# Patient Record
Sex: FEMALE | Race: WHITE | NOT HISPANIC OR LATINO | Employment: UNEMPLOYED | URBAN - METROPOLITAN AREA
[De-identification: names, ages, dates, MRNs, and addresses within clinical notes are randomized per-mention and may not be internally consistent; named-entity substitution may affect disease eponyms.]

---
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
## 2022-07-07 ENCOUNTER — OFFICE VISIT (OUTPATIENT)
Dept: URGENT CARE | Facility: PHYSICIAN GROUP | Age: 22
End: 2022-07-07
Payer: MEDICAID

## 2022-07-07 VITALS
RESPIRATION RATE: 14 BRPM | SYSTOLIC BLOOD PRESSURE: 110 MMHG | OXYGEN SATURATION: 98 % | HEART RATE: 104 BPM | DIASTOLIC BLOOD PRESSURE: 74 MMHG | TEMPERATURE: 98 F | WEIGHT: 104 LBS | HEIGHT: 61 IN | BODY MASS INDEX: 19.63 KG/M2

## 2022-07-07 DIAGNOSIS — J02.0 STREP PHARYNGITIS: ICD-10-CM

## 2022-07-07 LAB
INT CON NEG: NORMAL
INT CON POS: NORMAL
S PYO AG THROAT QL: NORMAL

## 2022-07-07 PROCEDURE — 87880 STREP A ASSAY W/OPTIC: CPT | Performed by: PHYSICIAN ASSISTANT

## 2022-07-07 PROCEDURE — 99203 OFFICE O/P NEW LOW 30 MIN: CPT | Performed by: PHYSICIAN ASSISTANT

## 2022-07-07 RX ORDER — AMOXICILLIN 875 MG/1
875 TABLET, COATED ORAL 2 TIMES DAILY
Qty: 20 TABLET | Refills: 0 | Status: SHIPPED | OUTPATIENT
Start: 2022-07-07 | End: 2022-07-17

## 2022-07-07 ASSESSMENT — ENCOUNTER SYMPTOMS
FEVER: 0
NAUSEA: 0
DIAPHORESIS: 0
MYALGIAS: 0
SORE THROAT: 1
SHORTNESS OF BREATH: 0
SPUTUM PRODUCTION: 0
PALPITATIONS: 0
COUGH: 0
WHEEZING: 0
DIARRHEA: 0
VOMITING: 0
CHILLS: 0
SINUS PAIN: 0
HEADACHES: 0
ABDOMINAL PAIN: 0
DIZZINESS: 0

## 2022-08-16 ENCOUNTER — OFFICE VISIT (OUTPATIENT)
Dept: URGENT CARE | Facility: PHYSICIAN GROUP | Age: 22
End: 2022-08-16
Payer: MEDICAID

## 2022-08-16 ENCOUNTER — HOSPITAL ENCOUNTER (OUTPATIENT)
Facility: MEDICAL CENTER | Age: 22
End: 2022-08-16
Payer: MEDICAID

## 2022-08-16 VITALS
WEIGHT: 107 LBS | SYSTOLIC BLOOD PRESSURE: 110 MMHG | DIASTOLIC BLOOD PRESSURE: 84 MMHG | OXYGEN SATURATION: 97 % | HEART RATE: 57 BPM | HEIGHT: 61 IN | TEMPERATURE: 98 F | BODY MASS INDEX: 20.2 KG/M2 | RESPIRATION RATE: 16 BRPM

## 2022-08-16 DIAGNOSIS — N89.8 VAGINAL DISCHARGE: ICD-10-CM

## 2022-08-16 LAB
APPEARANCE UR: CLEAR
BILIRUB UR STRIP-MCNC: NEGATIVE MG/DL
CANDIDA DNA VAG QL PROBE+SIG AMP: NEGATIVE
COLOR UR AUTO: YELLOW
G VAGINALIS DNA VAG QL PROBE+SIG AMP: NEGATIVE
GLUCOSE UR STRIP.AUTO-MCNC: NEGATIVE MG/DL
INT CON NEG: NORMAL
INT CON POS: NORMAL
KETONES UR STRIP.AUTO-MCNC: NEGATIVE MG/DL
LEUKOCYTE ESTERASE UR QL STRIP.AUTO: NEGATIVE
NITRITE UR QL STRIP.AUTO: NEGATIVE
PH UR STRIP.AUTO: 7 [PH] (ref 5–8)
POC URINE PREGNANCY TEST: NEGATIVE
PROT UR QL STRIP: NEGATIVE MG/DL
RBC UR QL AUTO: NEGATIVE
SP GR UR STRIP.AUTO: 1.01
T VAGINALIS DNA VAG QL PROBE+SIG AMP: NEGATIVE
UROBILINOGEN UR STRIP-MCNC: NEGATIVE MG/DL

## 2022-08-16 PROCEDURE — 81025 URINE PREGNANCY TEST: CPT

## 2022-08-16 PROCEDURE — 87510 GARDNER VAG DNA DIR PROBE: CPT

## 2022-08-16 PROCEDURE — 81002 URINALYSIS NONAUTO W/O SCOPE: CPT

## 2022-08-16 PROCEDURE — 87491 CHLMYD TRACH DNA AMP PROBE: CPT

## 2022-08-16 PROCEDURE — 99214 OFFICE O/P EST MOD 30 MIN: CPT

## 2022-08-16 PROCEDURE — 87591 N.GONORRHOEAE DNA AMP PROB: CPT

## 2022-08-16 PROCEDURE — 87480 CANDIDA DNA DIR PROBE: CPT

## 2022-08-16 PROCEDURE — 87660 TRICHOMONAS VAGIN DIR PROBE: CPT

## 2022-08-16 NOTE — PROGRESS NOTES
Subjective:   Jessica Lawrence is a 22 y.o. female who presents for Vaginal Discharge (White and thick, x1day last day of intercourse was thursday) and Abdominal Pain      HPI:  Vaginal discharge- This is a  new problem. Patient reports experiencing white, thick vaginal discharge x1 day. Vaginal discharge has resolved at this time. She denies foul smelling discharge. Denies vaginal itching. She does report mild pelvic pressure, denies pain. She has not taken anything for her symptoms.  Denies associated urinary symptoms, abdominal pain, n/v/d,fevers, chills, body aches. She is requesting STD screening today. She denies high risk sexual behaviors, or known STI exposure.       Review of Systems   Constitutional:  Negative for chills, diaphoresis, fever, malaise/fatigue and weight loss.   Gastrointestinal:  Negative for abdominal pain, nausea and vomiting.   Genitourinary:  Negative for dysuria, flank pain, frequency, hematuria and urgency.        +pelvic pressure   Musculoskeletal:  Negative for myalgias.   All other systems reviewed and are negative.    Medications:    Current Outpatient Medications on File Prior to Visit   Medication Sig Dispense Refill    cyclobenzaprine (FLEXERIL) 10 MG Tab Take 1 Tab by mouth 3 times a day as needed for Mild Pain or Muscle Spasms. (Patient not taking: No sig reported) 30 Tab 0    naproxen sodium (ANAPROX) 275 MG tablet Take 1 Tab by mouth 2 times a day as needed (pain). (Patient not taking: No sig reported) 20 Tab 0     No current facility-administered medications on file prior to visit.        Allergies:   Patient has no known allergies.    Problem List:   There is no problem list on file for this patient.       Surgical History:  No past surgical history on file.    Past Social Hx:   Social History     Tobacco Use    Smoking status: Never    Smokeless tobacco: Never   Vaping Use    Vaping Use: Never used   Substance Use Topics    Alcohol use: No    Drug use: No          Problem  "list, medications, and allergies reviewed by myself today in Epic.     Objective:     /84   Pulse (!) 57   Temp 36.7 °C (98 °F) (Temporal)   Resp 16   Ht 1.549 m (5' 1\")   Wt 48.5 kg (107 lb)   LMP 07/29/2022 (Approximate)   SpO2 97%   BMI 20.22 kg/m²     Physical Exam  Vitals and nursing note reviewed.   Constitutional:       General: She is awake. She is not in acute distress.     Appearance: Normal appearance. She is normal weight. She is not ill-appearing or toxic-appearing.   HENT:      Head: Normocephalic and atraumatic.   Cardiovascular:      Rate and Rhythm: Normal rate and regular rhythm.      Pulses: Normal pulses.      Heart sounds: Normal heart sounds. No murmur heard.    No friction rub. No gallop.   Pulmonary:      Effort: Pulmonary effort is normal. No respiratory distress.      Breath sounds: Normal breath sounds. No stridor. No wheezing, rhonchi or rales.   Chest:      Chest wall: No tenderness.   Abdominal:      General: Abdomen is flat. Bowel sounds are normal. There is no distension.      Palpations: Abdomen is soft. There is no mass.      Tenderness: There is no abdominal tenderness. There is no right CVA tenderness, left CVA tenderness, guarding or rebound.      Hernia: No hernia is present.   Genitourinary:     Comments: Deferred   Musculoskeletal:      Cervical back: Neck supple. No tenderness.   Lymphadenopathy:      Cervical: No cervical adenopathy.   Skin:     General: Skin is warm and dry.      Capillary Refill: Capillary refill takes less than 2 seconds.   Neurological:      General: No focal deficit present.      Mental Status: She is alert and oriented to person, place, and time. Mental status is at baseline.      Cranial Nerves: No cranial nerve deficit.      Motor: No weakness.      Gait: Gait normal.   Psychiatric:         Mood and Affect: Mood normal.         Behavior: Behavior normal. Behavior is cooperative.         Thought Content: Thought content normal.         " Judgment: Judgment normal.       Assessment/Plan:     Diagnosis and associated orders:   1. Vaginal discharge  POCT Urinalysis    POCT PREGNANCY    VAGINAL PATHOGENS DNA PANEL    Chlamydia/GC, PCR (Genital/Anal swab)    CANCELED: Chlamydia/GC, PCR (Genital/Anal swab)             Comments/MDM:   Pt is clinically stable at today's acute urgent care visit.  No acute distress noted. Appropriate for outpatient management at this time.     Acute problem. UA unremarkable today, POC HCG negative. Will send vaginal swabs for vaginal pathogen analysis and GC/Chlamydia; will follow and notify patient of results.  At this time, patient's vaginal discharge has resolved. Patient was instructed to monitor symptoms and return for any new or worsening s/s; follow up with GYN for recheck. Patient verbalized understanding.            Discussed DDx, management options (risks,benefits, and alternatives to planned treatment), natural progression and supportive care.  Expressed understanding and the treatment plan was agreed upon. Questions were encouraged and answered   Return to urgent care prn if new or worsening sx or if there is no improvement in condition prn.    Educated in Red flags and indications to immediately call 911 or present to the Emergency Department.   Advised the patient to follow-up with the primary care physician for recheck, reevaluation, and consideration of further management.    I personally reviewed prior external notes and test results pertinent to today's visit.  I have independently reviewed and interpreted all diagnostics ordered during this urgent care acute visit.        Please note that this dictation was created using voice recognition software. I have made a reasonable attempt to correct obvious errors, but I expect that there are errors of grammar and possibly content that I did not discover before finalizing the note.    This note was electronically signed by WAGNER Cruz

## 2022-08-17 LAB
C TRACH DNA GENITAL QL NAA+PROBE: NEGATIVE
N GONORRHOEA DNA GENITAL QL NAA+PROBE: NEGATIVE
SPECIMEN SOURCE: NORMAL

## 2022-08-17 ASSESSMENT — ENCOUNTER SYMPTOMS
FLANK PAIN: 0
FEVER: 0
DIAPHORESIS: 0
WEIGHT LOSS: 0
CHILLS: 0
MYALGIAS: 0
VOMITING: 0
NAUSEA: 0
ABDOMINAL PAIN: 0

## 2022-10-20 ENCOUNTER — HOSPITAL ENCOUNTER (EMERGENCY)
Facility: MEDICAL CENTER | Age: 22
End: 2022-10-20
Attending: EMERGENCY MEDICINE
Payer: MEDICAID

## 2022-10-20 VITALS
SYSTOLIC BLOOD PRESSURE: 117 MMHG | OXYGEN SATURATION: 97 % | WEIGHT: 111.55 LBS | TEMPERATURE: 98.5 F | BODY MASS INDEX: 21.06 KG/M2 | HEIGHT: 61 IN | RESPIRATION RATE: 16 BRPM | HEART RATE: 85 BPM | DIASTOLIC BLOOD PRESSURE: 79 MMHG

## 2022-10-20 DIAGNOSIS — J35.8 TONSILLAR BLEED: ICD-10-CM

## 2022-10-20 DIAGNOSIS — J02.9 PHARYNGITIS, UNSPECIFIED ETIOLOGY: ICD-10-CM

## 2022-10-20 LAB — S PYO DNA SPEC NAA+PROBE: NOT DETECTED

## 2022-10-20 PROCEDURE — 87651 STREP A DNA AMP PROBE: CPT

## 2022-10-20 PROCEDURE — 99283 EMERGENCY DEPT VISIT LOW MDM: CPT

## 2022-10-20 NOTE — DISCHARGE INSTRUCTIONS
Use a hydroperoxide water mixture and swish and spit rinse twice a day.  Return to [facility] if you profound pain, difficulty swallowing, bleeding.

## 2022-10-20 NOTE — ED PROVIDER NOTES
"ED Provider Note    CHIEF COMPLAINT  Chief Complaint   Patient presents with    Sent from Urgent Care    Sore Throat       HPI  Jessica Lawrence is a 22 y.o. female who presents to the emergency department with complaint of sore throat and bleeding from her throat.  She states that she woke up this morning and had a sore throat and noticed that her back her tonsils are slight blood.  Denies eating fish, any bony meat, any foreign body.  She does state that she had lasagna and groggy last night.  She does complains of slight neck discomfort.  She went to urgent care, they told her to come straight here without evaluating her.  The patient denies fever, shakes, chills, sweats, nausea, vomiting, profound neck pain, cough or recent trauma.    REVIEW OF SYSTEMS  Positives as above. Pertinent negatives include fever, cough, neck stiffness, neck pain      PAST MEDICAL HISTORY  History reviewed. No pertinent past medical history.    FAMILY HISTORY  Noncontributory    SOCIAL HISTORY  Social History     Socioeconomic History    Marital status: Single   Tobacco Use    Smoking status: Never    Smokeless tobacco: Never   Vaping Use    Vaping Use: Never used   Substance and Sexual Activity    Alcohol use: No    Drug use: No       SURGICAL HISTORY  History reviewed. No pertinent surgical history.    CURRENT MEDICATIONS  Home Medications    **Home medications have not yet been reviewed for this encounter**         ALLERGIES  No Known Allergies    PHYSICAL EXAM  VITAL SIGNS: /79   Pulse 85   Temp 36.9 °C (98.5 °F) (Temporal)   Resp 16   Ht 1.549 m (5' 1\")   Wt 50.6 kg (111 lb 8.8 oz)   LMP 09/20/2022 (Approximate)   SpO2 97%   BMI 21.08 kg/m²      Constitutional: Well developed, Well nourished, No acute distress, Non-toxic appearance.   Eyes: PERRLA, EOMI, Conjunctiva normal, No discharge.   HENT: Tonsillar edema, there is a clot in the left lingual fold the left tonsil, no foreign body, no active bleeding, no anterior " cervical lymphadenopathy, no abscess, no mass  Neck: No meningismus  Cardiovascular: Normal heart rate, Normal rhythm, No murmurs, No rubs, No gallops, and intact distal pulses.   Skin: Warm, Dry, No erythema, No rash.   Neurologic: Alert & oriented x 3, No focal deficits noted, acting appropriately on exam.  Psychiatric: Affect normal for clinical presentation.        LABORATORY/ECG  Results for orders placed or performed during the hospital encounter of 10/20/22   Group A Strep by PCR    Specimen: Throat   Result Value Ref Range    Group A Strep by PCR Not Detected Not Detected       COURSE & MEDICAL DECISION MAKING  Pertinent Labs & Imaging studies reviewed. (See chart for details)  This is a pleasant 22-year-old female presents with pharyngitis, slight hemorrhage in the posterior tonsil.  The patient has no evidence of strep throat, no [place] peritonsillar abscess.  Physical examination reveals evidence of a slight abrasion that has a clot formed.  Please refer to the picture.  She has no evidence of profound bleeding, no evidence of respiratory stress or respiratory failure.  I have asked the patient to follow-up [facility] she has increasing symptomatology as we do not have ENT on-call this facility, utilize ice therapy, hydroperoxide and water swish and spit.  The patient will be following up as needed.      FINAL IMPRESSION     1. Pharyngitis, unspecified etiology    2. Tonsillar bleed        DISPOSITION:  Patient will be discharged home in stable condition.    FOLLOW UP:  [address] [phone]    If symptoms worsen        Electronically signed by: Diego Hernandez D.O., 10/20/2022 9:38 AM

## 2022-10-20 NOTE — ED TRIAGE NOTES
"Jessica Lawrence  22 y.o.  Chief Complaint   Patient presents with    Sent from Urgent Care    Sore Throat     Pt ambulatory to triage.  Pt reports she has had a sore throat x 2 days.  Pt reports she woke up this morning and when she looked in her mouth with a flashlight she could see her \"tonsil bleeding\".  Pt went to urgent care and when she was checking in, she was told to come to the ER.  Pt in no distress in triage.  Pt rates her pain 2/10.  "

## 2022-10-20 NOTE — ED NOTES
Pt provided with dc paper work and follow up care instructions. Pt declines questions and was able to ambulate out of ER.

## 2022-10-30 ENCOUNTER — OFFICE VISIT (OUTPATIENT)
Dept: URGENT CARE | Facility: PHYSICIAN GROUP | Age: 22
End: 2022-10-30
Payer: MEDICAID

## 2022-10-30 VITALS
WEIGHT: 109 LBS | OXYGEN SATURATION: 99 % | SYSTOLIC BLOOD PRESSURE: 98 MMHG | HEART RATE: 99 BPM | TEMPERATURE: 97.9 F | RESPIRATION RATE: 16 BRPM | DIASTOLIC BLOOD PRESSURE: 58 MMHG | HEIGHT: 61 IN | BODY MASS INDEX: 20.58 KG/M2

## 2022-10-30 DIAGNOSIS — K13.0 INFECTION OF LIP: ICD-10-CM

## 2022-10-30 PROCEDURE — 99213 OFFICE O/P EST LOW 20 MIN: CPT | Performed by: FAMILY MEDICINE

## 2022-10-30 RX ORDER — DOXYCYCLINE HYCLATE 100 MG
100 TABLET ORAL 2 TIMES DAILY
Qty: 20 TABLET | Refills: 0 | Status: SHIPPED | OUTPATIENT
Start: 2022-10-30 | End: 2022-11-09

## 2022-10-30 NOTE — PROGRESS NOTES
"Chief Complaint:    Chief Complaint   Patient presents with    Mouth Lesions     Has been using otc, can't tell if it helping, painful, now spreading to gums, pus on lips. X 4 days       History of Present Illness:    Started with a bump on lower lip on 10/24/22, tried Carmex to area, but problem has progressed to involve much more of lower lip - tender, crusty, feels it is spreading to affect lower gum.      Past Medical History:    No past medical history on file.    Past Surgical History:    No past surgical history on file.    Social History:    Social History     Socioeconomic History    Marital status: Single     Spouse name: Not on file    Number of children: Not on file    Years of education: Not on file    Highest education level: Not on file   Occupational History    Not on file   Tobacco Use    Smoking status: Never    Smokeless tobacco: Never   Vaping Use    Vaping Use: Never used   Substance and Sexual Activity    Alcohol use: No    Drug use: No    Sexual activity: Not on file   Other Topics Concern    Not on file   Social History Narrative    Not on file     Social Determinants of Health     Financial Resource Strain: Not on file   Food Insecurity: Not on file   Transportation Needs: Not on file   Physical Activity: Not on file   Stress: Not on file   Social Connections: Not on file   Intimate Partner Violence: Not on file   Housing Stability: Not on file     Family History:    No family history on file.    Medications:    No current outpatient medications on file prior to visit.     No current facility-administered medications on file prior to visit.     Allergies:    No Known Allergies      Vitals:    Vitals:    10/30/22 1444   BP: (!) 98/58   Pulse: 99   Resp: 16   Temp: 36.6 °C (97.9 °F)   TempSrc: Temporal   SpO2: 99%   Weight: 49.4 kg (109 lb)   Height: 1.549 m (5' 1\")       Physical Exam:    Constitutional: Vital signs reviewed. Appears well-developed and well-nourished. No acute distress. "   Eyes: Sclera white, conjunctivae clear.   ENT: Lower lip: diffuse swelling and crusting. External ears normal. Hearing normal. Oral mucosa pink and moist. Posterior pharynx: WNL.  Neck: Neck supple.   Pulmonary/Chest: Respirations non-labored.   Musculoskeletal: Normal gait. No muscular atrophy or weakness.  Neurological: Alert and oriented to person, place, and time. Muscle tone normal. Coordination normal.   Skin: No rashes or lesions. Warm, dry, normal turgor.  Psychiatric: Normal mood and affect. Behavior is normal. Judgment and thought content normal.       Medical Decision Makin. Infection of lip  - doxycycline (VIBRAMYCIN) 100 MG Tab; Take 1 Tablet by mouth 2 times a day for 10 days.  Dispense: 20 Tablet; Refill: 0  - mupirocin (BACTROBAN) 2 % Ointment; APPLY TO AFFECTED AREA OF LIPS 3 TIMES A DAY UNTIL HEALED.  Dispense: 22 g; Refill: 3       Discussed with her DDX, management options, and risks, benefits, and alternatives to treatment plan agreed upon.    Started with a bump on lower lip on 10/24/22, tried Carmex to area, but problem has progressed to involve much more of lower lip - tender, crusty, feels it is spreading to affect lower gum.    Lower lip: diffuse swelling and crusting.    Lip findings look like bacterial infection and she is agreeable to treat as such.    Agreeable to medications prescribed.    Discussed expected course of duration, time for improvement, and to seek follow-up in Emergency Room, urgent care, or with PCP if getting worse at any time or not improving within expected time frame.

## 2023-10-27 ENCOUNTER — HOSPITAL ENCOUNTER (EMERGENCY)
Facility: MEDICAL CENTER | Age: 23
End: 2023-10-27
Attending: EMERGENCY MEDICINE
Payer: MEDICAID

## 2023-10-27 VITALS
SYSTOLIC BLOOD PRESSURE: 138 MMHG | DIASTOLIC BLOOD PRESSURE: 86 MMHG | HEIGHT: 61 IN | WEIGHT: 116.84 LBS | OXYGEN SATURATION: 97 % | RESPIRATION RATE: 18 BRPM | BODY MASS INDEX: 22.06 KG/M2 | TEMPERATURE: 98.5 F | HEART RATE: 80 BPM

## 2023-10-27 DIAGNOSIS — S05.02XA ABRASION OF LEFT CORNEA, INITIAL ENCOUNTER: ICD-10-CM

## 2023-10-27 PROCEDURE — 700101 HCHG RX REV CODE 250: Mod: UD | Performed by: EMERGENCY MEDICINE

## 2023-10-27 PROCEDURE — 99283 EMERGENCY DEPT VISIT LOW MDM: CPT

## 2023-10-27 RX ORDER — PROPARACAINE HYDROCHLORIDE 5 MG/ML
1 SOLUTION/ DROPS OPHTHALMIC ONCE
Status: COMPLETED | OUTPATIENT
Start: 2023-10-27 | End: 2023-10-27

## 2023-10-27 RX ADMIN — PROPARACAINE HYDROCHLORIDE 1 DROP: 5 SOLUTION/ DROPS OPHTHALMIC at 17:15

## 2023-10-27 RX ADMIN — FLUORESCEIN SODIUM 1 MG: 1 STRIP OPHTHALMIC at 17:15

## 2023-10-27 NOTE — ED TRIAGE NOTES
"Chief Complaint   Patient presents with    Eye Pain     Pt began having L eye irritation this morning at 0300. Pt denies blurred vision. Redness noted to L eye.      /83   Pulse 98   Temp 37.1 °C (98.8 °F) (Temporal)   Resp 18   Ht 1.549 m (5' 1\")   Wt 53 kg (116 lb 13.5 oz)   SpO2 97%   BMI 22.08 kg/m²       "

## 2023-10-27 NOTE — ED PROVIDER NOTES
"ED Provider Note    CHIEF COMPLAINT  Chief Complaint   Patient presents with    Eye Pain     Pt began having L eye irritation this morning at 0300. Pt denies blurred vision. Redness noted to L eye.        HPI/ROS  LIMITATION TO HISTORY   Select: : None  OUTSIDE HISTORIAN(S):  alena    Jessica Lawrence is a 23 y.o. female who presents to the emerged department for chief complaint of left eye pain.  She states it started irritating her in the middle the night last night she rubbed it really hard and then woke up with a red eye and pain and noticed something she could see on her cornea.  She denies any acute trauma to the eye she denies any vision changes.  She denies any contact where or any concern for foreign body.  It is mildly irritating    PAST MEDICAL HISTORY       SURGICAL HISTORY  patient denies any surgical history    FAMILY HISTORY  History reviewed. No pertinent family history.    SOCIAL HISTORY  Social History     Tobacco Use    Smoking status: Never    Smokeless tobacco: Never   Vaping Use    Vaping Use: Never used   Substance and Sexual Activity    Alcohol use: No    Drug use: No    Sexual activity: Not on file       CURRENT MEDICATIONS  Home Medications       Reviewed by Marcelina Worthington R.N. (Registered Nurse) on 10/27/23 at 1557  Med List Status: Partial     Medication Last Dose Status   mupirocin (BACTROBAN) 2 % Ointment  Active                    ALLERGIES  No Known Allergies    PHYSICAL EXAM  VITAL SIGNS: /83   Pulse 98   Temp 37.1 °C (98.8 °F) (Temporal)   Resp 18   Ht 1.549 m (5' 1\")   Wt 53 kg (116 lb 13.5 oz)   SpO2 97%   BMI 22.08 kg/m²    Pulse OX: Pulse Oxygen level is normal  Constitutional: Alert in no apparent distress.  HENT: Normocephalic, Atraumatic, MMM  Eyes: PERRL - L eye there is a corneal abrasion at the 3 o'clock position negative Nicki sign there is injected conjunctive a no discharge no upper or lower eyelid swelling no erythema around the eye, right eye within normal " limits.   Visual Acuity Screening performed.      OS (Left Eye): 20/40+1    OD (Right Eye): 20/25-3   OU (Both Eyes): 20/20  Heart: Regular rate and rhythm, intact distal pulses   Lungs: Symmetrical movement, no resp distress   Skin: Warm, Dry, No erythema, No rash.   Neurologic: Alert and oriented, Grossly non-focal.       DIAGNOSTIC STUDIES / PROCEDURES      COURSE & MEDICAL DECISION MAKING    ED Observation Status? No; Patient does not meet criteria for ED Observation.     INITIAL ASSESSMENT, COURSE AND PLAN/ DISPOSITION AND DISCUSSIONS  Care Narrative:       Patient is a 23-year-old female presents emerged part with physical examination consistent with a left-sided corneal abrasion.  There is no signs of anterior chamber infection visual acuity is mildly diminished on the left compared to right.  She will be referred to ophthalmology and prescribed gentamicin we discussed uses at home.  Eye patch if needed as well as return precautions for any swelling of the periorbital regions with there is no signs of periorbital cellulitis or orbital cellulitis at this time.    I have discussed management of the patient with the following physicians and JUSTINO's:  none    Discussion of management with other QHP or appropriate source(s): None     Escalation of care considered, and ultimately not performed:blood analysis    Barriers to care at this time, including but not limited to: Patient does not have established PCP.     Decision tools and prescription drugs considered including, but not limited to: Antibiotics ointment was prescribed .    The patient will return for new or worsening symptoms and is stable at the time of discharge.    The patient is referred to a primary physician for blood pressure management, diabetic screening, and for all other preventative health concerns.    DISPOSITION:  Patient will be discharged home in stable condition.    FOLLOW UP:  Carroll Cortez M.D.  [address]  [address]  [place]  [address]  [phone]    Call on 10/30/2023  to schedule follow up      OUTPATIENT MEDICATIONS:  Discharge Medication List as of 10/27/2023  5:22 PM        START taking these medications    Details   gentamicin (GARAMYCIN) 0.3 % Ointment Apply 0.5 Inches to left eye 3 times a day for 7 days., Disp-3.5 g, R-0, Normal               FINAL DIAGNOSIS  1. Abrasion of left cornea, initial encounter           Electronically signed by: Nelida Higginbotham M.D., 10/27/2023 4:44 PM

## 2023-10-28 RX ORDER — GENTAMICIN SULFATE 3 MG/ML
1 SOLUTION/ DROPS OPHTHALMIC EVERY 4 HOURS
Qty: 5 ML | Refills: 0 | Status: SHIPPED | OUTPATIENT
Start: 2023-10-28 | End: 2023-11-02

## 2023-10-28 NOTE — ED NOTES
Visual Acuity Screening performed.     OS (Left Eye): 20/40+1    OD (Right Eye): 20/25-3   OU (Both Eyes): 20/20

## 2023-10-28 NOTE — ED NOTES
Discharge instructions discussed with pt, verbalizes understanding. All belongings with pt when leaving. Pt visiting family in other ER room.   Patient returned call and states he was just seen with PCP on 9.19, please advise.  Patient was advised of the one month recheck, states he doesn't understand why he needs a follow up since everything is going fine for him, as he states it'll be hard to take a day of a work to be seen, please advise.  Thank you,  Karla Major  Patient Representative

## 2023-10-28 NOTE — DISCHARGE PLANNING
ER  Note:  Received call from [facility] from [facility] saying they do not have gentamicin ointment available and medication has been discontinued, but they do have gentamicin eye drops available. RN CM called [facility] and [facility], ointment also not available. Discussed with ERP. Dr. Felder sent new order. RN CM called [employer], spoke to [facility]. She confirmed they received new order. Per [facility], they will process the order and call pt.

## 2024-03-15 ENCOUNTER — OFFICE VISIT (OUTPATIENT)
Dept: URGENT CARE | Facility: PHYSICIAN GROUP | Age: 24
End: 2024-03-15
Payer: MEDICAID

## 2024-03-15 VITALS
OXYGEN SATURATION: 98 % | DIASTOLIC BLOOD PRESSURE: 74 MMHG | HEIGHT: 61 IN | SYSTOLIC BLOOD PRESSURE: 108 MMHG | HEART RATE: 95 BPM | BODY MASS INDEX: 22.84 KG/M2 | WEIGHT: 121 LBS | TEMPERATURE: 97.9 F | RESPIRATION RATE: 16 BRPM

## 2024-03-15 DIAGNOSIS — J01.00 ACUTE NON-RECURRENT MAXILLARY SINUSITIS: ICD-10-CM

## 2024-03-15 PROCEDURE — 3078F DIAST BP <80 MM HG: CPT | Performed by: PHYSICIAN ASSISTANT

## 2024-03-15 PROCEDURE — 99214 OFFICE O/P EST MOD 30 MIN: CPT | Performed by: PHYSICIAN ASSISTANT

## 2024-03-15 PROCEDURE — 3074F SYST BP LT 130 MM HG: CPT | Performed by: PHYSICIAN ASSISTANT

## 2024-03-15 RX ORDER — BENZONATATE 100 MG/1
100 CAPSULE ORAL 3 TIMES DAILY PRN
Qty: 30 CAPSULE | Refills: 0 | Status: SHIPPED | OUTPATIENT
Start: 2024-03-15

## 2024-03-15 RX ORDER — ALBUTEROL SULFATE 90 UG/1
2 AEROSOL, METERED RESPIRATORY (INHALATION) EVERY 6 HOURS PRN
Qty: 8.5 G | Refills: 0 | Status: SHIPPED | OUTPATIENT
Start: 2024-03-15

## 2024-03-15 RX ORDER — AMOXICILLIN AND CLAVULANATE POTASSIUM 875; 125 MG/1; MG/1
1 TABLET, FILM COATED ORAL 2 TIMES DAILY
Qty: 14 TABLET | Refills: 0 | Status: SHIPPED | OUTPATIENT
Start: 2024-03-15 | End: 2024-03-22

## 2024-03-15 ASSESSMENT — ENCOUNTER SYMPTOMS
RHINORRHEA: 1
ABDOMINAL PAIN: 0
MYALGIAS: 0
SORE THROAT: 0
WHEEZING: 1
CHILLS: 0
DIARRHEA: 0
SHORTNESS OF BREATH: 0
VOMITING: 0
DIZZINESS: 0
SINUS PAIN: 1
NAUSEA: 0
HEADACHES: 1
CARDIOVASCULAR NEGATIVE: 1
FEVER: 0
COUGH: 1

## 2024-03-15 NOTE — PROGRESS NOTES
"Subjective     Jessica Be is a very pleasant 23 y.o. female who presents with Cough (X 3 Weeks with congestion, mild wheezing. )            Cough  This is a new problem. The current episode started 1 to 4 weeks ago. The problem has been gradually worsening. The problem occurs every few minutes. The cough is Non-productive. Associated symptoms include ear congestion, headaches, nasal congestion, postnasal drip, rhinorrhea and wheezing. Pertinent negatives include no chest pain, chills, ear pain, fever, myalgias, sore throat or shortness of breath. She has tried OTC cough suppressant for the symptoms. The treatment provided mild relief. There is no history of asthma or pneumonia.       PMH:  has no past medical history on file.  MEDS: No current outpatient medications on file.  ALLERGIES: No Known Allergies  SURGHX: No past surgical history on file.  SOCHX:  reports that she has never smoked. She has never used smokeless tobacco. She reports that she does not drink alcohol and does not use drugs.  FH: family history is not on file.      Review of Systems   Constitutional:  Positive for malaise/fatigue. Negative for chills and fever.   HENT:  Positive for congestion, postnasal drip, rhinorrhea and sinus pain. Negative for ear pain and sore throat.    Respiratory:  Positive for cough and wheezing. Negative for shortness of breath.    Cardiovascular: Negative.  Negative for chest pain.   Gastrointestinal:  Negative for abdominal pain, diarrhea, nausea and vomiting.   Musculoskeletal:  Negative for myalgias.   Neurological:  Positive for headaches. Negative for dizziness.       Medications, Allergies, and current problem list reviewed today in Epic             Objective     /74   Pulse 95   Temp 36.6 °C (97.9 °F) (Temporal)   Resp 16   Ht 1.549 m (5' 1\")   Wt 54.9 kg (121 lb)   LMP 03/06/2024   SpO2 98%   BMI 22.86 kg/m²      Physical Exam  Vitals and nursing note reviewed.   Constitutional:      "  General: She is not in acute distress.     Appearance: Normal appearance. She is well-developed. She is not ill-appearing, toxic-appearing or diaphoretic.   HENT:      Head: Normocephalic and atraumatic.      Right Ear: Hearing, tympanic membrane, ear canal and external ear normal. No decreased hearing noted. Tympanic membrane is not erythematous.      Left Ear: Hearing, tympanic membrane, ear canal and external ear normal. No decreased hearing noted. Tympanic membrane is not erythematous.      Nose: Mucosal edema, congestion and rhinorrhea present. Rhinorrhea is purulent.      Right Turbinates: Swollen.      Left Turbinates: Swollen.      Right Sinus: Maxillary sinus tenderness present.      Left Sinus: Maxillary sinus tenderness present.      Mouth/Throat:      Mouth: Mucous membranes are moist.      Dentition: Normal dentition.      Pharynx: Posterior oropharyngeal erythema present. No oropharyngeal exudate.      Comments: Copious colored PND noted  Eyes:      General: No scleral icterus.        Right eye: No discharge.         Left eye: No discharge.      Conjunctiva/sclera: Conjunctivae normal.   Cardiovascular:      Rate and Rhythm: Normal rate and regular rhythm.      Pulses: Normal pulses.      Heart sounds: Normal heart sounds. No murmur heard.  Pulmonary:      Effort: Pulmonary effort is normal. No respiratory distress.      Breath sounds: Normal breath sounds. No stridor. No wheezing, rhonchi or rales.   Musculoskeletal:      Cervical back: Normal range of motion and neck supple.   Lymphadenopathy:      Cervical: No cervical adenopathy.   Skin:     General: Skin is warm and dry.      Nails: There is no clubbing.   Neurological:      General: No focal deficit present.      Mental Status: She is alert and oriented to person, place, and time. Mental status is at baseline.   Psychiatric:         Mood and Affect: Mood normal.         Behavior: Behavior normal.         Thought Content: Thought content normal.          Judgment: Judgment normal.                         Assessment & Plan     This is a very pleasant 23-year-old female presenting with over 3 weeks of cough, congestion, headache, postnasal drip and fatigue.  Intermittent fever chills and bodyaches but nothing recently.  Tight chest with productive cough but no shortness of breath, wheezing, chest pain.  Eating and drinking normal without vomiting or diarrhea.  No pertinent past respiratory history.  Vital signs appropriate.  Exam shows sinusitis type symptoms with postnasal drip.  Lungs are clear without wheezing, rhonchi, rales or stridor.  No signs of lower respiratory involvement.       1. Acute non-recurrent maxillary sinusitis  amoxicillin-clavulanate (AUGMENTIN) 875-125 MG Tab    benzonatate (TESSALON) 100 MG Cap    albuterol 108 (90 Base) MCG/ACT Aero Soln inhalation aerosol            Take full course of antibiotic  Tylenol and Motrin for fever and pain  OTC meds as discussed including oral decongestant if tolerated  Increase fluids and rest  Nasal spray, nasal rinse/wash, lola pot      I personally reviewed prior external notes and test results pertinent to today's visit. Return to clinic or go to ED if symptoms worsen or persist. Red flag symptoms and indications for ED discussed at length. Patient/Parent/Guardian voices understanding.  AVS with post-visit instructions provided or given verbally.  Follow-up with your primary care provider in 3-5 days. All side effects and potential interactions of prescribed medication discussed including allergic response, GI upset, tendon injury, rash, sedation, OCP effectiveness, etc.    Please note that this dictation was created using voice recognition software. I have made every reasonable attempt to correct obvious errors, but I expect that there are errors of grammar and possibly content that I did not discover before finalizing the note.

## 2024-08-19 ENCOUNTER — OFFICE VISIT (OUTPATIENT)
Dept: URGENT CARE | Facility: PHYSICIAN GROUP | Age: 24
End: 2024-08-19
Payer: MEDICAID

## 2024-08-19 VITALS
OXYGEN SATURATION: 98 % | WEIGHT: 123.4 LBS | RESPIRATION RATE: 14 BRPM | SYSTOLIC BLOOD PRESSURE: 106 MMHG | HEART RATE: 72 BPM | HEIGHT: 72 IN | TEMPERATURE: 98.4 F | DIASTOLIC BLOOD PRESSURE: 66 MMHG | BODY MASS INDEX: 16.71 KG/M2

## 2024-08-19 DIAGNOSIS — T23.261A PARTIAL THICKNESS BURN OF BACK OF RIGHT HAND, INITIAL ENCOUNTER: ICD-10-CM

## 2024-08-19 PROCEDURE — 90471 IMMUNIZATION ADMIN: CPT | Performed by: FAMILY MEDICINE

## 2024-08-19 PROCEDURE — 90715 TDAP VACCINE 7 YRS/> IM: CPT | Performed by: FAMILY MEDICINE

## 2024-08-19 PROCEDURE — 3078F DIAST BP <80 MM HG: CPT | Performed by: FAMILY MEDICINE

## 2024-08-19 PROCEDURE — 3074F SYST BP LT 130 MM HG: CPT | Performed by: FAMILY MEDICINE

## 2024-08-19 PROCEDURE — 99213 OFFICE O/P EST LOW 20 MIN: CPT | Mod: 25 | Performed by: FAMILY MEDICINE

## 2024-08-19 RX ORDER — SILVER SULFADIAZINE 10 MG/G
1 CREAM TOPICAL DAILY
Qty: 25 G | Refills: 0 | Status: SHIPPED | OUTPATIENT
Start: 2024-08-19
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
## 2025-05-16 ENCOUNTER — OFFICE VISIT (OUTPATIENT)
Dept: URGENT CARE | Facility: PHYSICIAN GROUP | Age: 25
End: 2025-05-16
Payer: MEDICAID

## 2025-05-16 ENCOUNTER — HOSPITAL ENCOUNTER (OUTPATIENT)
Facility: MEDICAL CENTER | Age: 25
End: 2025-05-16
Attending: FAMILY MEDICINE
Payer: MEDICAID

## 2025-05-16 VITALS
WEIGHT: 117.8 LBS | DIASTOLIC BLOOD PRESSURE: 68 MMHG | RESPIRATION RATE: 14 BRPM | BODY MASS INDEX: 22.24 KG/M2 | HEART RATE: 79 BPM | OXYGEN SATURATION: 98 % | TEMPERATURE: 99 F | HEIGHT: 61 IN | SYSTOLIC BLOOD PRESSURE: 118 MMHG

## 2025-05-16 DIAGNOSIS — M54.6 ACUTE LEFT-SIDED THORACIC BACK PAIN: ICD-10-CM

## 2025-05-16 DIAGNOSIS — M54.6 ACUTE LEFT-SIDED THORACIC BACK PAIN: Primary | ICD-10-CM

## 2025-05-16 LAB
APPEARANCE UR: CLEAR
BILIRUB UR STRIP-MCNC: NEGATIVE MG/DL
COLOR UR AUTO: YELLOW
GLUCOSE UR STRIP.AUTO-MCNC: NEGATIVE MG/DL
KETONES UR STRIP.AUTO-MCNC: NEGATIVE MG/DL
LEUKOCYTE ESTERASE UR QL STRIP.AUTO: NEGATIVE
NITRITE UR QL STRIP.AUTO: NEGATIVE
PH UR STRIP.AUTO: 6.5 [PH] (ref 5–8)
POCT INT CON NEG: NEGATIVE
POCT INT CON POS: POSITIVE
POCT URINE PREGNANCY TEST: NEGATIVE
PROT UR QL STRIP: NEGATIVE MG/DL
RBC UR QL AUTO: NEGATIVE
SP GR UR STRIP.AUTO: <=1.005
UROBILINOGEN UR STRIP-MCNC: 0.2 MG/DL

## 2025-05-16 PROCEDURE — 81002 URINALYSIS NONAUTO W/O SCOPE: CPT | Performed by: FAMILY MEDICINE

## 2025-05-16 PROCEDURE — 81025 URINE PREGNANCY TEST: CPT | Performed by: FAMILY MEDICINE

## 2025-05-16 PROCEDURE — 3074F SYST BP LT 130 MM HG: CPT | Performed by: FAMILY MEDICINE

## 2025-05-16 PROCEDURE — 3078F DIAST BP <80 MM HG: CPT | Performed by: FAMILY MEDICINE

## 2025-05-16 PROCEDURE — 99214 OFFICE O/P EST MOD 30 MIN: CPT | Performed by: FAMILY MEDICINE

## 2025-05-16 PROCEDURE — 87086 URINE CULTURE/COLONY COUNT: CPT

## 2025-05-16 RX ORDER — HYDROCODONE BITARTRATE AND ACETAMINOPHEN 5; 325 MG/1; MG/1
1 TABLET ORAL EVERY 6 HOURS PRN
Qty: 5 TABLET | Refills: 0 | Status: SHIPPED | OUTPATIENT
Start: 2025-05-16 | End: 2025-05-23

## 2025-05-16 RX ORDER — CYCLOBENZAPRINE HCL 5 MG
5-10 TABLET ORAL EVERY 8 HOURS PRN
Qty: 30 TABLET | Refills: 0 | Status: SHIPPED | OUTPATIENT
Start: 2025-05-16

## 2025-05-16 NOTE — PROGRESS NOTES
"    Back Pain  This is a new problem. The current episode started in the past 7 days. The problem occurs constantly. The problem is unchanged. The pain is present in the lumbar spine. The quality of the pain is described as aching. The pain does not radiate. The pain is moderate. The symptoms are aggravated by position. Pertinent negatives include no bladder incontinence, bowel incontinence, dysuria, fever, headaches, numbness or weakness. Treatments tried: motrin.  The treatment provided no relief.     Social History[1]    Family hx was reviewed - no pertinent past family hx      Past medical history was unremarkable and not pertinent to current issue    Review of Systems   Constitutional: Negative for fever.   Gastrointestinal: Negative for bowel incontinence.   Genitourinary: Negative for bladder incontinence, dysuria, urgency and frequency.   Musculoskeletal: Positive for back pain.   Neurological: Negative for weakness, numbness and headaches.   All other systems reviewed and are negative.         Objective:     /68   Pulse 79   Temp 37.2 °C (99 °F) (Temporal)   Resp 14   Ht 1.549 m (5' 1\")   Wt 53.4 kg (117 lb 12.8 oz)   SpO2 98%     Physical Exam   Constitutional: pt is oriented to person, place, and time. Pt appears well-developed and well-nourished. No distress.   HENT:   Head: Normocephalic and atraumatic.   Eyes: EOM are normal. Pupils are equal, round, and reactive to light. No scleral icterus.   Neck: Normal range of motion. Neck supple. No thyromegaly present.   Cardiovascular: Normal rate, regular rhythm and normal heart sounds.    Pulmonary/Chest: Effort normal and breath sounds normal. No respiratory distress.  no wheezes.   no rales.  no tenderness.   Musculoskeletal: pt exhibits no edema.        Right knee: Normal.        Left knee: Normal.               Lumbar back: pt exhibits decreased range of motion, spasm and tenderness . Pt exhibits no bony tenderness, no swelling, no edema, no " deformity  .   Neurological: patient is alert and oriented to person, place, and time. Patient has normal reflexes. No cranial nerve deficit. Patient exhibits normal muscle tone.   Reflex Scores:       Patellar reflexes are 2+ on the right side and 2+ on the left side.  STRAIGHT LEG RAISE, KEYON NEGATIVE BILAT  Skin: Skin is warm and dry. No rash noted. No erythema.   Psychiatric: patient has a normal mood and affect.  behavior is normal.   Nursing note and vitals reviewed.           Lab Results   Component Value Date/Time    POCCOLOR Yellow 05/16/2025 03:31 PM    POCAPPEAR clear 05/16/2025 03:31 PM    POCLEUKEST Negative 05/16/2025 03:31 PM    POCNITRITE Negative 05/16/2025 03:31 PM    POCUROBILIGE 0.2 05/16/2025 03:31 PM    POCPROTEIN Negative 05/16/2025 03:31 PM    POCURPH 6.5 05/16/2025 03:31 PM    POCBLOOD Negative 05/16/2025 03:31 PM    POCSPGRV <=1.005 05/16/2025 03:31 PM    POCKETONES Negative 05/16/2025 03:31 PM    POCBILIRUBIN Negative 05/16/2025 03:31 PM    POCGLUCUA Negative 05/16/2025 03:31 PM        Assessment/Plan:        1. Acute left-sided thoracic back pain (Primary)   UA unremarkable.   Hcg neg   Urine sent for cx      - HYDROcodone-acetaminophen (NORCO) 5-325 MG Tab per tablet; Take 1 Tablet by mouth every 6 hours as needed (pain) for up to 7 days.  Dispense: 5 Tablet; Refill: 0  - cyclobenzaprine (FLEXERIL) 5 MG tablet; Take 1-2 Tablets by mouth every 8 hours as needed for Muscle Spasms.  Dispense: 30 Tablet; Refill: 0    Differential diagnosis, natural history, supportive care, and indications for immediate follow-up discussed. All questions answered. Patient agrees with the plan of care.     Follow-up as needed if symptoms worsen or fail to improve to PCP, Urgent care or Emergency Room.     I have personally reviewed prior external notes and test results pertinent to today's visit.  I have independently reviewed and interpreted all diagnostics ordered during this urgent care acute visit.                [1]   Social History  Tobacco Use    Smoking status: Never    Smokeless tobacco: Never   Vaping Use    Vaping status: Never Used   Substance Use Topics    Alcohol use: No    Drug use: No

## 2025-05-19 ENCOUNTER — HOSPITAL ENCOUNTER (EMERGENCY)
Facility: MEDICAL CENTER | Age: 25
End: 2025-05-19
Attending: EMERGENCY MEDICINE
Payer: MEDICAID

## 2025-05-19 ENCOUNTER — APPOINTMENT (OUTPATIENT)
Dept: RADIOLOGY | Facility: MEDICAL CENTER | Age: 25
End: 2025-05-19
Attending: EMERGENCY MEDICINE
Payer: MEDICAID

## 2025-05-19 ENCOUNTER — OFFICE VISIT (OUTPATIENT)
Dept: URGENT CARE | Facility: PHYSICIAN GROUP | Age: 25
End: 2025-05-19
Payer: MEDICAID

## 2025-05-19 VITALS
OXYGEN SATURATION: 95 % | SYSTOLIC BLOOD PRESSURE: 124 MMHG | RESPIRATION RATE: 16 BRPM | BODY MASS INDEX: 22.2 KG/M2 | DIASTOLIC BLOOD PRESSURE: 84 MMHG | HEIGHT: 61 IN | TEMPERATURE: 99 F | WEIGHT: 117.6 LBS | HEART RATE: 97 BPM

## 2025-05-19 VITALS
BODY MASS INDEX: 22.08 KG/M2 | OXYGEN SATURATION: 98 % | DIASTOLIC BLOOD PRESSURE: 84 MMHG | HEART RATE: 83 BPM | WEIGHT: 116.84 LBS | TEMPERATURE: 98.9 F | RESPIRATION RATE: 16 BRPM | SYSTOLIC BLOOD PRESSURE: 127 MMHG

## 2025-05-19 DIAGNOSIS — R10.30 LOWER ABDOMINAL PAIN: Primary | ICD-10-CM

## 2025-05-19 DIAGNOSIS — R10.32 LEFT LOWER QUADRANT ABDOMINAL PAIN: Primary | ICD-10-CM

## 2025-05-19 DIAGNOSIS — R10.9 ABDOMINAL PAIN, UNSPECIFIED ABDOMINAL LOCATION: ICD-10-CM

## 2025-05-19 LAB
ALBUMIN SERPL BCP-MCNC: 4.8 G/DL (ref 3.2–4.9)
ALBUMIN/GLOB SERPL: 1.5 G/DL
ALP SERPL-CCNC: 57 U/L (ref 30–99)
ALT SERPL-CCNC: 20 U/L (ref 2–50)
ANION GAP SERPL CALC-SCNC: 12 MMOL/L (ref 7–16)
APPEARANCE UR: CLEAR
AST SERPL-CCNC: 16 U/L (ref 12–45)
BACTERIA UR CULT: NORMAL
BASOPHILS # BLD AUTO: 0.8 % (ref 0–1.8)
BASOPHILS # BLD: 0.06 K/UL (ref 0–0.12)
BILIRUB SERPL-MCNC: 0.5 MG/DL (ref 0.1–1.5)
BILIRUB UR QL STRIP.AUTO: NEGATIVE
BUN SERPL-MCNC: 10 MG/DL (ref 8–22)
CALCIUM ALBUM COR SERPL-MCNC: 9.9 MG/DL (ref 8.5–10.5)
CALCIUM SERPL-MCNC: 10.5 MG/DL (ref 8.5–10.5)
CHLORIDE SERPL-SCNC: 103 MMOL/L (ref 96–112)
CO2 SERPL-SCNC: 23 MMOL/L (ref 20–33)
COLOR UR: YELLOW
CREAT SERPL-MCNC: 0.64 MG/DL (ref 0.5–1.4)
EOSINOPHIL # BLD AUTO: 0.1 K/UL (ref 0–0.51)
EOSINOPHIL NFR BLD: 1.3 % (ref 0–6.9)
ERYTHROCYTE [DISTWIDTH] IN BLOOD BY AUTOMATED COUNT: 39.8 FL (ref 35.9–50)
GFR SERPLBLD CREATININE-BSD FMLA CKD-EPI: 125 ML/MIN/1.73 M 2
GLOBULIN SER CALC-MCNC: 3.2 G/DL (ref 1.9–3.5)
GLUCOSE SERPL-MCNC: 86 MG/DL (ref 65–99)
GLUCOSE UR STRIP.AUTO-MCNC: NEGATIVE MG/DL
HCG SERPL QL: NEGATIVE
HCT VFR BLD AUTO: 39.9 % (ref 37–47)
HGB BLD-MCNC: 13 G/DL (ref 12–16)
IMM GRANULOCYTES # BLD AUTO: 0.02 K/UL (ref 0–0.11)
IMM GRANULOCYTES NFR BLD AUTO: 0.3 % (ref 0–0.9)
KETONES UR STRIP.AUTO-MCNC: NEGATIVE MG/DL
LEUKOCYTE ESTERASE UR QL STRIP.AUTO: NEGATIVE
LIPASE SERPL-CCNC: 19 U/L (ref 11–82)
LYMPHOCYTES # BLD AUTO: 1.42 K/UL (ref 1–4.8)
LYMPHOCYTES NFR BLD: 17.8 % (ref 22–41)
MCH RBC QN AUTO: 28.7 PG (ref 27–33)
MCHC RBC AUTO-ENTMCNC: 32.6 G/DL (ref 32.2–35.5)
MCV RBC AUTO: 88.1 FL (ref 81.4–97.8)
MICRO URNS: NORMAL
MONOCYTES # BLD AUTO: 0.45 K/UL (ref 0–0.85)
MONOCYTES NFR BLD AUTO: 5.7 % (ref 0–13.4)
NEUTROPHILS # BLD AUTO: 5.91 K/UL (ref 1.82–7.42)
NEUTROPHILS NFR BLD: 74.1 % (ref 44–72)
NITRITE UR QL STRIP.AUTO: NEGATIVE
NRBC # BLD AUTO: 0 K/UL
NRBC BLD-RTO: 0 /100 WBC (ref 0–0.2)
PH UR STRIP.AUTO: 7 [PH] (ref 5–8)
PLATELET # BLD AUTO: 315 K/UL (ref 164–446)
PMV BLD AUTO: 8.3 FL (ref 9–12.9)
POTASSIUM SERPL-SCNC: 3.5 MMOL/L (ref 3.6–5.5)
PROT SERPL-MCNC: 8 G/DL (ref 6–8.2)
PROT UR QL STRIP: NEGATIVE MG/DL
RBC # BLD AUTO: 4.53 M/UL (ref 4.2–5.4)
RBC UR QL AUTO: NEGATIVE
SIGNIFICANT IND 70042: NORMAL
SITE SITE: NORMAL
SODIUM SERPL-SCNC: 138 MMOL/L (ref 135–145)
SOURCE SOURCE: NORMAL
SP GR UR STRIP.AUTO: 1
UROBILINOGEN UR STRIP.AUTO-MCNC: 0.2 EU/DL
WBC # BLD AUTO: 8 K/UL (ref 4.8–10.8)

## 2025-05-19 PROCEDURE — 80053 COMPREHEN METABOLIC PANEL: CPT

## 2025-05-19 PROCEDURE — 99284 EMERGENCY DEPT VISIT MOD MDM: CPT

## 2025-05-19 PROCEDURE — 76830 TRANSVAGINAL US NON-OB: CPT

## 2025-05-19 PROCEDURE — 99213 OFFICE O/P EST LOW 20 MIN: CPT | Mod: 25 | Performed by: NURSE PRACTITIONER

## 2025-05-19 PROCEDURE — 3074F SYST BP LT 130 MM HG: CPT | Performed by: NURSE PRACTITIONER

## 2025-05-19 PROCEDURE — 81003 URINALYSIS AUTO W/O SCOPE: CPT

## 2025-05-19 PROCEDURE — 3079F DIAST BP 80-89 MM HG: CPT | Performed by: NURSE PRACTITIONER

## 2025-05-19 PROCEDURE — 83690 ASSAY OF LIPASE: CPT

## 2025-05-19 PROCEDURE — 700117 HCHG RX CONTRAST REV CODE 255: Performed by: EMERGENCY MEDICINE

## 2025-05-19 PROCEDURE — 74177 CT ABD & PELVIS W/CONTRAST: CPT

## 2025-05-19 PROCEDURE — 36415 COLL VENOUS BLD VENIPUNCTURE: CPT

## 2025-05-19 PROCEDURE — 85025 COMPLETE CBC W/AUTO DIFF WBC: CPT

## 2025-05-19 PROCEDURE — 84703 CHORIONIC GONADOTROPIN ASSAY: CPT

## 2025-05-19 RX ORDER — KETOROLAC TROMETHAMINE 15 MG/ML
15 INJECTION, SOLUTION INTRAMUSCULAR; INTRAVENOUS ONCE
Status: COMPLETED | OUTPATIENT
Start: 2025-05-19 | End: 2025-05-19

## 2025-05-19 RX ADMIN — KETOROLAC TROMETHAMINE 15 MG: 15 INJECTION, SOLUTION INTRAMUSCULAR; INTRAVENOUS at 17:30

## 2025-05-19 RX ADMIN — IOHEXOL 100 ML: 350 INJECTION, SOLUTION INTRAVENOUS at 20:55

## 2025-05-19 ASSESSMENT — PAIN DESCRIPTION - PAIN TYPE: TYPE: ACUTE PAIN

## 2025-05-19 ASSESSMENT — ENCOUNTER SYMPTOMS: ABDOMINAL PAIN: 1

## 2025-05-20 ENCOUNTER — RESULTS FOLLOW-UP (OUTPATIENT)
Dept: URGENT CARE | Facility: PHYSICIAN GROUP | Age: 25
End: 2025-05-20

## 2025-05-20 NOTE — PROGRESS NOTES
"Subjective:     Jessica Be is a 25 y.o. female who presents for Abdominal Pain (Pt states she's had abdominal pain that started this morning at 10am. Its gotten worse throughout the day.At home remedies not helping.  Left sided abdominal pain that has been moving to her flank. Bloating started today as well )      Abdominal Pain      Pt presents for evaluation of a new problem. Jessica is a very pleasant 25-year-old female who presents to urgent care today with complaints of left lower abdominal pain that started this morning around 10 AM.  Her pain has remained constant throughout the entire day and rates this as a 10/10.  She states that she has been unable to eat any food since her pain started due to her discomfort.  Negative for fever, nausea, vomiting or diarrhea.  Last bowel movement was approximately 1 hour ago and was normal.  She did take Tums earlier today which she states minimally reduced her discomfort for a very short period of time.  She was seen in urgent care 3 days ago and treated for left lower back pain that she described as spasms.  This pain has now resolved.     Review of Systems   Gastrointestinal:  Positive for abdominal pain.       PMH: Past Medical History[1]  ALLERGIES: Allergies[2]  SURGHX: Past Surgical History[3]  SOCHX: Social History[4]  FH: No family history on file.      Objective:   /84   Pulse 97   Temp 37.2 °C (99 °F) (Temporal)   Resp 16   Ht 1.549 m (5' 1\")   Wt 53.3 kg (117 lb 9.6 oz)   SpO2 95%   BMI 22.22 kg/m²     Physical Exam  Vitals and nursing note reviewed.   Constitutional:       General: She is not in acute distress.     Appearance: Normal appearance. She is normal weight. She is not ill-appearing or toxic-appearing.   HENT:      Head: Normocephalic.      Right Ear: External ear normal.      Left Ear: External ear normal.      Nose: No congestion or rhinorrhea.      Mouth/Throat:      Pharynx: No oropharyngeal exudate or posterior " oropharyngeal erythema.   Eyes:      General:         Right eye: No discharge.         Left eye: No discharge.      Pupils: Pupils are equal, round, and reactive to light.   Pulmonary:      Effort: Pulmonary effort is normal.   Abdominal:      General: Abdomen is flat.   Musculoskeletal:         General: Normal range of motion.      Cervical back: Normal range of motion and neck supple.   Skin:     General: Skin is dry.   Neurological:      General: No focal deficit present.      Mental Status: She is alert and oriented to person, place, and time. Mental status is at baseline.   Psychiatric:         Mood and Affect: Mood normal.         Behavior: Behavior normal.         Thought Content: Thought content normal.         Judgment: Judgment normal.         Assessment/Plan:   Assessment    1. Left lower quadrant abdominal pain  ketorolac (Toradol) 15 MG/ML injection 15 mg      2. Abdominal pain, unspecified abdominal location  POCT Pregnancy    POCT Urinalysis        Patient was referred to follow-up in emergency room for severe left lower abdominal pain as I am unable to order any lab or imaging.  Differential diagnoses discussed with patient.  She was given 15 mg IM Toradol injection for relief of her discomfort.  She was educated to refrain from additional NSAID use for the next 24 hours.  ER transfer center notified of patient's impending arrival.  At this time she is safe for self transportation.           [1] No past medical history on file.  [2] No Known Allergies  [3] No past surgical history on file.  [4]   Social History  Socioeconomic History    Marital status: Single   Tobacco Use    Smoking status: Never    Smokeless tobacco: Never   Vaping Use    Vaping status: Never Used   Substance and Sexual Activity    Alcohol use: No    Drug use: No

## 2025-05-20 NOTE — ED TRIAGE NOTES
Chief Complaint   Patient presents with    Abdominal Pain     Symptoms around 10 am this morning.  Pt was sent by  for Kidney stone, uterine cysts.  LPM 4/25-29.  Pt reports laguerre not feel like cramping.  Denies vaginal bleeding.

## 2025-05-20 NOTE — ED PROVIDER NOTES
ED Provider Note    CHIEF COMPLAINT  Chief Complaint   Patient presents with    Abdominal Pain     Symptoms around 10 am this morning.  Pt was sent by  for Kidney stone, uterine cysts.  LPM .  Pt reports laguerre not feel like cramping.  Denies vaginal bleeding.       EXTERNAL RECORDS REVIEWED  Other urgent care note from earlier today reviewed    HPI/ROS  LIMITATION TO HISTORY   Select: : None  OUTSIDE HISTORIAN(S):  None    Jessica Be is a 25 y.o. female who presents to the emergency department for the abdominal pain.  Past medical history is largely benign.  G2, P0 with 1 prior  and 1 prior miscarriage.  Does not believe that she is currently pregnant.  Starting this morning she has had lower abdominal discomfort mostly focal to the left lower quadrant but radiating throughout the abdomen to about the umbilicus.  No significant midepigastric pain.  No nausea vomit.  No recent change in bowel or bladder output.  No vaginal bleeding.  Last menstrual period 3 weeks ago.  Typically outside of regular.    Patient went initially to urgent care where they provided Toradol and then transferred her to here for further workup.  Currently resting comfortably.    PAST MEDICAL HISTORY       SURGICAL HISTORY  patient denies any surgical history    FAMILY HISTORY  History reviewed. No pertinent family history.    SOCIAL HISTORY  Social History     Tobacco Use    Smoking status: Never    Smokeless tobacco: Never   Vaping Use    Vaping status: Never Used   Substance and Sexual Activity    Alcohol use: No    Drug use: No    Sexual activity: Not on file       CURRENT MEDICATIONS  Home Medications    **Home medications have not yet been reviewed for this encounter**       Audit from Redirected Encounters    **Home medications have not yet been reviewed for this encounter**         ALLERGIES  Allergies[1]    PHYSICAL EXAM  VITAL SIGNS: BP (!) 138/94   Pulse 86   Temp 38 °C (100.4 °F) (Temporal)   Resp 18    Wt 53 kg (116 lb 13.5 oz)   SpO2 96%   BMI 22.08 kg/m²      Pulse ox interpretation: I interpret this pulse ox as normal.  Constitutional: Alert in no apparent distress.  HENT: No signs of trauma, Bilateral external ears normal, Nose normal.   Eyes: Pupils are equal and reactive  Neck: Normal range of motion, No tenderness, Supple  Cardiovascular: Regular rate and rhythm, no murmurs.   Thorax & Lungs: Normal breath sounds, No respiratory distress  Abdomen: Bowel sounds normal, Soft, diffuse abdominal tenderness greatest in the left lower quadrant.  No significant guarding or rebound.  Skin: Warm, Dry, No erythema, No rash.   Back: No bony tenderness, No CVA tenderness.   Musculoskeletal: Good range of motion in all major joints. No tenderness to palpation or major deformities noted.   Neurologic: Alert , Normal motor function, Normal sensory function, No focal deficits noted.   Psychiatric: Affect normal, Judgment normal, Mood normal.         EKG/LABS  Results for orders placed or performed during the hospital encounter of 05/19/25   CBC WITH DIFFERENTIAL    Collection Time: 05/19/25  6:50 PM   Result Value Ref Range    WBC 8.0 4.8 - 10.8 K/uL    RBC 4.53 4.20 - 5.40 M/uL    Hemoglobin 13.0 12.0 - 16.0 g/dL    Hematocrit 39.9 37.0 - 47.0 %    MCV 88.1 81.4 - 97.8 fL    MCH 28.7 27.0 - 33.0 pg    MCHC 32.6 32.2 - 35.5 g/dL    RDW 39.8 35.9 - 50.0 fL    Platelet Count 315 164 - 446 K/uL    MPV 8.3 (L) 9.0 - 12.9 fL    Neutrophils-Polys 74.10 (H) 44.00 - 72.00 %    Lymphocytes 17.80 (L) 22.00 - 41.00 %    Monocytes 5.70 0.00 - 13.40 %    Eosinophils 1.30 0.00 - 6.90 %    Basophils 0.80 0.00 - 1.80 %    Immature Granulocytes 0.30 0.00 - 0.90 %    Nucleated RBC 0.00 0.00 - 0.20 /100 WBC    Neutrophils (Absolute) 5.91 1.82 - 7.42 K/uL    Lymphs (Absolute) 1.42 1.00 - 4.80 K/uL    Monos (Absolute) 0.45 0.00 - 0.85 K/uL    Eos (Absolute) 0.10 0.00 - 0.51 K/uL    Baso (Absolute) 0.06 0.00 - 0.12 K/uL    Immature  Granulocytes (abs) 0.02 0.00 - 0.11 K/uL    NRBC (Absolute) 0.00 K/uL   COMP METABOLIC PANEL    Collection Time: 05/19/25  6:50 PM   Result Value Ref Range    Sodium 138 135 - 145 mmol/L    Potassium 3.5 (L) 3.6 - 5.5 mmol/L    Chloride 103 96 - 112 mmol/L    Co2 23 20 - 33 mmol/L    Anion Gap 12.0 7.0 - 16.0    Glucose 86 65 - 99 mg/dL    Bun 10 8 - 22 mg/dL    Creatinine 0.64 0.50 - 1.40 mg/dL    Calcium 10.5 8.5 - 10.5 mg/dL    Correct Calcium 9.9 8.5 - 10.5 mg/dL    AST(SGOT) 16 12 - 45 U/L    ALT(SGPT) 20 2 - 50 U/L    Alkaline Phosphatase 57 30 - 99 U/L    Total Bilirubin 0.5 0.1 - 1.5 mg/dL    Albumin 4.8 3.2 - 4.9 g/dL    Total Protein 8.0 6.0 - 8.2 g/dL    Globulin 3.2 1.9 - 3.5 g/dL    A-G Ratio 1.5 g/dL   LIPASE    Collection Time: 05/19/25  6:50 PM   Result Value Ref Range    Lipase 19 11 - 82 U/L   HCG QUAL SERUM    Collection Time: 05/19/25  6:50 PM   Result Value Ref Range    Beta-Hcg Qualitative Serum Negative Negative   ESTIMATED GFR    Collection Time: 05/19/25  6:50 PM   Result Value Ref Range    GFR (CKD-EPI) 125 >60 mL/min/1.73 m 2   URINALYSIS,CULTURE IF INDICATED    Collection Time: 05/19/25  7:43 PM    Specimen: Urine   Result Value Ref Range    Color Yellow     Character Clear     Specific Gravity 1.005 <1.035    Ph 7.0 5.0 - 8.0    Glucose Negative Negative mg/dL    Ketones Negative Negative mg/dL    Protein Negative Negative mg/dL    Bilirubin Negative Negative    Urobilinogen, Urine 0.2 <=1.0 EU/dL    Nitrite Negative Negative    Leukocyte Esterase Negative Negative    Occult Blood Negative Negative    Micro Urine Req see below        I have independently interpreted this EKG    RADIOLOGY/PROCEDURES   I have independently interpreted the diagnostic imaging associated with this visit and am waiting the final reading from the radiologist.   My preliminary interpretation is as follows: No obvious inflammatory changes lower abdomen    Radiologist interpretation:  US-PELVIC COMPLETE  (TRANSABDOMINAL/TRANSVAGINAL) (COMBO)   Final Result         1.  Trace free fluid in the posterior cul-de-sac, otherwise normal transvaginal appearance of the pelvis.   2.  Nabothian cysts      CT-ABDOMEN-PELVIS WITH   Final Result         1.  Hepatomegaly          COURSE & MEDICAL DECISION MAKING    ASSESSMENT, COURSE AND PLAN  Care Narrative: Patient resenting with lower abdominal pain.  Will get labs, UA and as well as imaging to include ultrasound and CT abdomen pelvis    DISPOSITION AND DISCUSSIONS  I have discussed management of the patient with the following physicians and JUSTINO's: None    Discussion of management with other QHP or appropriate source(s): None     Escalation of care considered, and ultimately not performed:acute inpatient care management, however at this time, the patient is most appropriate for outpatient management    Barriers to care at this time, including but not limited to: Patient does not have established PCP.     Decision tools and prescription drugs considered including, but not limited to: None.    25-year-old female presented to the ER with above presentation.  Workup is reassuring.  White count negative.  Only slight hypokalemia.  Imaging studies of both unremarkable from an acute emergent standpoint.  Will refer to outpatient gynecology and is understanding of return precautions if needed.      FINAL DIAGNOSIS  1. Lower abdominal pain         Electronically signed by: Abdiel Burnham M.D., 5/19/2025 7:45 PM           [1]   Allergies  Allergen Reactions    Penicillins      Childhood allergy